# Patient Record
Sex: FEMALE | Race: BLACK OR AFRICAN AMERICAN | NOT HISPANIC OR LATINO | URBAN - METROPOLITAN AREA
[De-identification: names, ages, dates, MRNs, and addresses within clinical notes are randomized per-mention and may not be internally consistent; named-entity substitution may affect disease eponyms.]

---

## 2024-07-19 ENCOUNTER — HOSPITAL ENCOUNTER (EMERGENCY)
Facility: OTHER | Age: 34
Discharge: HOME | End: 2024-07-19
Attending: EMERGENCY MEDICINE
Payer: MEDICAID

## 2024-07-19 VITALS
RESPIRATION RATE: 15 BRPM | WEIGHT: 240 LBS | TEMPERATURE: 98 F | SYSTOLIC BLOOD PRESSURE: 102 MMHG | OXYGEN SATURATION: 98 % | BODY MASS INDEX: 38.57 KG/M2 | HEIGHT: 66 IN | HEART RATE: 90 BPM | DIASTOLIC BLOOD PRESSURE: 53 MMHG

## 2024-07-19 DIAGNOSIS — G43.909 MIGRAINE WITHOUT STATUS MIGRAINOSUS, NOT INTRACTABLE, UNSPECIFIED MIGRAINE TYPE: Primary | ICD-10-CM

## 2024-07-19 LAB
ALBUMIN SERPL BCP-MCNC: 4 G/DL (ref 3.5–5.2)
ALP SERPL-CCNC: 78 U/L (ref 55–135)
ALT SERPL W/O P-5'-P-CCNC: 19 U/L (ref 10–44)
ANION GAP SERPL CALC-SCNC: 13 MMOL/L (ref 8–16)
AST SERPL-CCNC: 17 U/L (ref 10–40)
BASOPHILS # BLD AUTO: 0.05 K/UL (ref 0–0.2)
BASOPHILS NFR BLD: 0.5 % (ref 0–1.9)
BILIRUB SERPL-MCNC: 0.3 MG/DL (ref 0.1–1)
BUN SERPL-MCNC: 12 MG/DL (ref 6–20)
CALCIUM SERPL-MCNC: 9.8 MG/DL (ref 8.7–10.5)
CHLORIDE SERPL-SCNC: 99 MMOL/L (ref 95–110)
CO2 SERPL-SCNC: 24 MMOL/L (ref 23–29)
CREAT SERPL-MCNC: 0.9 MG/DL (ref 0.5–1.4)
DIFFERENTIAL METHOD BLD: ABNORMAL
EOSINOPHIL # BLD AUTO: 0.1 K/UL (ref 0–0.5)
EOSINOPHIL NFR BLD: 1.4 % (ref 0–8)
ERYTHROCYTE [DISTWIDTH] IN BLOOD BY AUTOMATED COUNT: 14.9 % (ref 11.5–14.5)
EST. GFR  (NO RACE VARIABLE): >60 ML/MIN/1.73 M^2
GLUCOSE SERPL-MCNC: 141 MG/DL (ref 70–110)
HCT VFR BLD AUTO: 39.5 % (ref 37–48.5)
HGB BLD-MCNC: 13.3 G/DL (ref 12–16)
IMM GRANULOCYTES # BLD AUTO: 0.03 K/UL (ref 0–0.04)
IMM GRANULOCYTES NFR BLD AUTO: 0.3 % (ref 0–0.5)
LYMPHOCYTES # BLD AUTO: 3.3 K/UL (ref 1–4.8)
LYMPHOCYTES NFR BLD: 35 % (ref 18–48)
MCH RBC QN AUTO: 28.8 PG (ref 27–31)
MCHC RBC AUTO-ENTMCNC: 33.7 G/DL (ref 32–36)
MCV RBC AUTO: 86 FL (ref 82–98)
MONOCYTES # BLD AUTO: 0.7 K/UL (ref 0.3–1)
MONOCYTES NFR BLD: 7.9 % (ref 4–15)
NEUTROPHILS # BLD AUTO: 5.1 K/UL (ref 1.8–7.7)
NEUTROPHILS NFR BLD: 54.9 % (ref 38–73)
NRBC BLD-RTO: 0 /100 WBC
PLATELET # BLD AUTO: 375 K/UL (ref 150–450)
PMV BLD AUTO: 11.2 FL (ref 9.2–12.9)
POTASSIUM SERPL-SCNC: 2.8 MMOL/L (ref 3.5–5.1)
PROT SERPL-MCNC: 8.5 G/DL (ref 6–8.4)
RBC # BLD AUTO: 4.62 M/UL (ref 4–5.4)
SODIUM SERPL-SCNC: 136 MMOL/L (ref 136–145)
WBC # BLD AUTO: 9.35 K/UL (ref 3.9–12.7)

## 2024-07-19 PROCEDURE — 80053 COMPREHEN METABOLIC PANEL: CPT | Performed by: EMERGENCY MEDICINE

## 2024-07-19 PROCEDURE — 96375 TX/PRO/DX INJ NEW DRUG ADDON: CPT

## 2024-07-19 PROCEDURE — 96366 THER/PROPH/DIAG IV INF ADDON: CPT

## 2024-07-19 PROCEDURE — 25000003 PHARM REV CODE 250: Performed by: EMERGENCY MEDICINE

## 2024-07-19 PROCEDURE — 96361 HYDRATE IV INFUSION ADD-ON: CPT

## 2024-07-19 PROCEDURE — 99284 EMERGENCY DEPT VISIT MOD MDM: CPT | Mod: 25

## 2024-07-19 PROCEDURE — 96367 TX/PROPH/DG ADDL SEQ IV INF: CPT

## 2024-07-19 PROCEDURE — 96372 THER/PROPH/DIAG INJ SC/IM: CPT | Mod: 59 | Performed by: EMERGENCY MEDICINE

## 2024-07-19 PROCEDURE — 85025 COMPLETE CBC W/AUTO DIFF WBC: CPT | Performed by: EMERGENCY MEDICINE

## 2024-07-19 PROCEDURE — 63600175 PHARM REV CODE 636 W HCPCS: Performed by: EMERGENCY MEDICINE

## 2024-07-19 PROCEDURE — 96365 THER/PROPH/DIAG IV INF INIT: CPT

## 2024-07-19 RX ORDER — BUTALBITAL, ACETAMINOPHEN AND CAFFEINE 50; 325; 40 MG/1; MG/1; MG/1
1 TABLET ORAL EVERY 6 HOURS PRN
Qty: 15 TABLET | Refills: 0 | Status: SHIPPED | OUTPATIENT
Start: 2024-07-19 | End: 2024-08-18

## 2024-07-19 RX ORDER — DEXAMETHASONE SODIUM PHOSPHATE 4 MG/ML
4 INJECTION, SOLUTION INTRA-ARTICULAR; INTRALESIONAL; INTRAMUSCULAR; INTRAVENOUS; SOFT TISSUE
Status: COMPLETED | OUTPATIENT
Start: 2024-07-19 | End: 2024-07-19

## 2024-07-19 RX ORDER — MAGNESIUM SULFATE HEPTAHYDRATE 40 MG/ML
2 INJECTION, SOLUTION INTRAVENOUS
Status: COMPLETED | OUTPATIENT
Start: 2024-07-19 | End: 2024-07-19

## 2024-07-19 RX ORDER — METOCLOPRAMIDE HYDROCHLORIDE 5 MG/ML
10 INJECTION INTRAMUSCULAR; INTRAVENOUS
Status: COMPLETED | OUTPATIENT
Start: 2024-07-19 | End: 2024-07-19

## 2024-07-19 RX ORDER — SUMATRIPTAN 6 MG/.5ML
6 INJECTION, SOLUTION SUBCUTANEOUS
Status: COMPLETED | OUTPATIENT
Start: 2024-07-19 | End: 2024-07-19

## 2024-07-19 RX ORDER — POTASSIUM CHLORIDE 7.45 MG/ML
10 INJECTION INTRAVENOUS
Status: COMPLETED | OUTPATIENT
Start: 2024-07-19 | End: 2024-07-19

## 2024-07-19 RX ORDER — DIPHENHYDRAMINE HYDROCHLORIDE 50 MG/ML
25 INJECTION INTRAMUSCULAR; INTRAVENOUS
Status: COMPLETED | OUTPATIENT
Start: 2024-07-19 | End: 2024-07-19

## 2024-07-19 RX ORDER — KETOROLAC TROMETHAMINE 30 MG/ML
15 INJECTION, SOLUTION INTRAMUSCULAR; INTRAVENOUS
Status: COMPLETED | OUTPATIENT
Start: 2024-07-19 | End: 2024-07-19

## 2024-07-19 RX ORDER — ONDANSETRON 4 MG/1
4 TABLET, ORALLY DISINTEGRATING ORAL EVERY 8 HOURS PRN
Qty: 15 TABLET | Refills: 0 | Status: SHIPPED | OUTPATIENT
Start: 2024-07-19

## 2024-07-19 RX ADMIN — KETOROLAC TROMETHAMINE 15 MG: 30 INJECTION, SOLUTION INTRAMUSCULAR; INTRAVENOUS at 12:07

## 2024-07-19 RX ADMIN — MAGNESIUM SULFATE HEPTAHYDRATE 2 G: 40 INJECTION, SOLUTION INTRAVENOUS at 03:07

## 2024-07-19 RX ADMIN — SODIUM CHLORIDE 1000 ML: 9 INJECTION, SOLUTION INTRAVENOUS at 12:07

## 2024-07-19 RX ADMIN — DIPHENHYDRAMINE HYDROCHLORIDE 25 MG: 50 INJECTION, SOLUTION INTRAMUSCULAR; INTRAVENOUS at 12:07

## 2024-07-19 RX ADMIN — SUMATRIPTAN 6 MG: 6 INJECTION, SOLUTION SUBCUTANEOUS at 03:07

## 2024-07-19 RX ADMIN — POTASSIUM CHLORIDE 10 MEQ: 7.46 INJECTION, SOLUTION INTRAVENOUS at 02:07

## 2024-07-19 RX ADMIN — METOCLOPRAMIDE 10 MG: 5 INJECTION, SOLUTION INTRAMUSCULAR; INTRAVENOUS at 12:07

## 2024-07-19 RX ADMIN — POTASSIUM BICARBONATE 50 MEQ: 978 TABLET, EFFERVESCENT ORAL at 03:07

## 2024-07-19 RX ADMIN — DEXAMETHASONE SODIUM PHOSPHATE 4 MG: 4 INJECTION INTRA-ARTICULAR; INTRALESIONAL; INTRAMUSCULAR; INTRAVENOUS; SOFT TISSUE at 03:07

## 2024-07-19 NOTE — ED PROVIDER NOTES
Encounter Date: 7/18/2024       History     Chief Complaint   Patient presents with    Headache     C/o headache/migraine x 2.5 weeks, Seen in multiple ED's with no relief, Prescribed new meds to go with migraine meds with no relief,      33-year-old female with history of migraines, HTN presents for evaluation of persistent headache that has been ongoing for about 2 weeks.  Patient has been dealing with migraines for years, but have become more frequent in the past year and a half.  She has seen a neurologist and takes Maxalt for it which usually resolves it after a few days.  She had onset of her typical migrainous headache 2 weeks ago with no clear precipitant, no recent trauma or illness.  She describes it as left-sided retro-orbital throbbing pain that radiates to the back of her head associated with neck and shoulder tightness.  She tried taking Maxalt as well as numerous OTC medications including Excedrin, ibuprofen, Goody ASA all with no improvement.  She was seen at [facility] ER 4 days ago and was given migraine cocktail with improvement for a few hours but after she woke up that night the headache was back.  She also had a tele appointment with her PCP who advised adding naproxen but this did not improve her at all.  She saw her dermatologist earlier today and was referred to [facility] ED due to elevated blood pressure and headache, but waited there for hours without seeing a provider, though she did get a head CT before leaving and coming here.  She reports associated nausea with no emesis, has been trying to force herself to eat with somewhat normal p.o. intake.  She also reports associated photophobia, but no fevers or other new complaints.      Review of patient's allergies indicates:  No Known Allergies  No past medical history on file.  No past surgical history on file.  No family history on file.     Review of Systems   Constitutional:  Negative for fever.   HENT:  Negative for congestion.    Eyes:  Positive  for photophobia. Negative for redness.   Respiratory:  Negative for shortness of breath.    Cardiovascular:  Negative for chest pain.   Gastrointestinal:  Positive for nausea. Negative for abdominal pain.   Genitourinary:  Negative for dysuria.   Musculoskeletal:  Positive for neck pain.   Skin:  Negative for rash.   Neurological:  Positive for headaches.   Psychiatric/Behavioral:  Negative for confusion.        Physical Exam     Initial Vitals [07/19/24 0003]   BP Pulse Resp Temp SpO2   (!) 141/99 100 16 98 °F (36.7 °C) 100 %      MAP       --         Physical Exam    Constitutional: She is not diaphoretic. No distress.   HENT:   Head: Normocephalic and atraumatic.   Eyes: Conjunctivae are normal.   Photophobia   Neck: Neck supple.   No meningeal signs   Cardiovascular:  Normal rate, regular rhythm, S1 normal, S2 normal, normal heart sounds and intact distal pulses.           No murmur heard.  Pulmonary/Chest: Breath sounds normal. No respiratory distress. She has no wheezes. She has no rhonchi. She has no rales.   Abdominal: Abdomen is soft. There is no abdominal tenderness. There is no rebound and no guarding.   Musculoskeletal:         General: No edema.      Cervical back: Neck supple.     Neurological: She is alert and oriented to person, place, and time. She has normal strength. No cranial nerve deficit.   Skin: Skin is warm and dry.   Psychiatric: She has a normal mood and affect.         ED Course   Procedures  Labs Reviewed   CBC W/ AUTO DIFFERENTIAL - Abnormal; Notable for the following components:       Result Value    RDW 14.9 (*)     All other components within normal limits   COMPREHENSIVE METABOLIC PANEL - Abnormal; Notable for the following components:    Potassium 2.8 (*)     Glucose 141 (*)     Total Protein 8.5 (*)     All other components within normal limits          Imaging Results    None          Medications   sodium chloride 0.9% bolus 1,000 mL 1,000 mL (0 mLs Intravenous Stopped 7/19/24  0404)   ketorolac injection 15 mg (15 mg Intravenous Given 7/19/24 0043)   metoclopramide injection 10 mg (10 mg Intravenous Given 7/19/24 0044)   diphenhydrAMINE injection 25 mg (25 mg Intravenous Given 7/19/24 0043)   potassium chloride 10 mEq in 100 mL IVPB (0 mEq Intravenous Stopped 7/19/24 0404)   potassium bicarbonate disintegrating tablet 50 mEq (50 mEq Oral Given 7/19/24 0327)   magnesium sulfate 2g in water 50mL IVPB (premix) (0 g Intravenous Stopped 7/19/24 0525)   dexAMETHasone injection 4 mg (4 mg Intravenous Given 7/19/24 0319)   SUMAtriptan succinate injection 6 mg (6 mg Subcutaneous Given 7/19/24 0328)     Medical Decision Making      33-year-old female with history of migraines, HTN presents for evaluation of persistent headache that has been ongoing for about 2 weeks.  Patient has long history of migraines with similar headache that she has now, described as left-sided retro-orbital with radiation to the back of her head with neck and shoulder tightness.  Initial onset about 2 weeks ago without any clear precipitant, but no relief after taking her prescribed Maxalt and multiple other OTC medications; typically she has relief after a few days of headache.  She was seen at [facility] 4 days ago and treated with migraine cocktail with relief for few hours but headache recurred when she went home.  She was sent to [facility] ED by dermatology clinic due to elevated blood pressure and headache and had CT head there earlier tonight that was normal, but was not seen by provider before leaving and coming here.  She reports associated nausea, photophobia, but has been eating normally; no fevers or other new symptoms.  On arrival patient with mildly elevated blood pressure but otherwise normal vitals, afebrile with photophobia resting comfortably in dark room, no neuro deficits or meningeal signs or other concerning exam findings.  Differential diagnosis includes intractable migraine, tension headache, cluster headache.   No evidence for meningitis or intracranial hemorrhage, no indication for repeat imaging.  Will try migraine cocktail again and consider adding medications to break migrainous cycle.      After initial migraine cocktail of Toradol/Reglan/Benadryl, patient feels headache much improved, resting comfortably with no further nausea.  CBC unremarkable, but CMP shows hypokalemia with K 2.8, treated with IV and p.o. KCl; I suspect this is related to her poor p.o. intake related to persistent migraine and nausea over the past few weeks and should resolve when her appetite normalizes.  She was then given subQ Imitrex, IV Decadron, and IV magnesium as well and is now completely headache free, and she was observed for additional hours with no recurrence.  Patient advised to continue Maxalt as needed for any recurrent migraines, but also try NSAIDs, will also Rx Fioricet as second-line treatment.  She is comfortable with this discharge plan and understands return precautions for any recurrent persistent headache or other concerns.        Amount and/or Complexity of Data Reviewed  Labs: ordered.    Risk  Prescription drug management.                                      Clinical Impression:  Final diagnoses:  [G43.909] Migraine without status migrainosus, not intractable, unspecified migraine type (Primary)          ED Disposition Condition    Discharge Stable          ED Prescriptions       Medication Sig Dispense Start Date End Date Auth. Provider    ondansetron (ZOFRAN-ODT) 4 MG TbDL Take 1 tablet (4 mg total) by mouth every 8 (eight) hours as needed. 15 tablet 7/19/2024 -- Bradley Saavedra MD    butalbital-acetaminophen-caffeine -40 mg (FIORICET, ESGIC) -40 mg per tablet Take 1 tablet by mouth every 6 (six) hours as needed for Headaches. 15 tablet 7/19/2024 8/18/2024 Bradley Saavedra MD          Follow-up Information       Follow up With Specialties Details Why Contact Info    [facility] - Emergency Dept  Emergency Medicine Go to  If symptoms worsen [address] [facility] [address]  [phone]             Bradley Saavedra MD  07/19/24 8747

## 2024-07-19 NOTE — ED NOTES
Jacinto Hummel, a 33 y.o. female presents to the ED with 10/10 headache, covering head with jacket, exhibits photophobia. Pt denies nausea/vomiting. No other needs at this time. Visitor at bedside.    Pt resting comfortably.ED workup in progress. Call light within reach. Safety measures in place. Denies further needs. Plan of care ongoing.      Chief Complaint   Patient presents with    Headache     C/o headache/migraine x 2.5 weeks, Seen in multiple ED's with no relief, Prescribed new meds to go with migraine meds with no relief,      Review of patient's allergies indicates:  No Known Allergies  No past medical history on file.  No past surgical history on file.

## 2025-07-26 ENCOUNTER — HOSPITAL ENCOUNTER (EMERGENCY)
Facility: OTHER | Age: 35
Discharge: HOME | End: 2025-07-26
Attending: EMERGENCY MEDICINE
Payer: COMMERCIAL

## 2025-07-26 VITALS
BODY MASS INDEX: 37.67 KG/M2 | DIASTOLIC BLOOD PRESSURE: 88 MMHG | TEMPERATURE: 99 F | WEIGHT: 240 LBS | OXYGEN SATURATION: 100 % | SYSTOLIC BLOOD PRESSURE: 138 MMHG | RESPIRATION RATE: 14 BRPM | HEIGHT: 67 IN | HEART RATE: 79 BPM

## 2025-07-26 DIAGNOSIS — G43.009 MIGRAINE WITHOUT AURA AND WITHOUT STATUS MIGRAINOSUS, NOT INTRACTABLE: Primary | ICD-10-CM

## 2025-07-26 LAB
B-HCG UR QL: NEGATIVE
CTP QC/QA: YES
HCV AB SERPL QL IA: NORMAL
HIV 1+2 AB+HIV1 P24 AG SERPL QL IA: NORMAL

## 2025-07-26 PROCEDURE — 25000003 PHARM REV CODE 250: Performed by: NURSE PRACTITIONER

## 2025-07-26 PROCEDURE — 87389 HIV-1 AG W/HIV-1&-2 AB AG IA: CPT | Performed by: EMERGENCY MEDICINE

## 2025-07-26 PROCEDURE — 86803 HEPATITIS C AB TEST: CPT | Performed by: EMERGENCY MEDICINE

## 2025-07-26 PROCEDURE — 81025 URINE PREGNANCY TEST: CPT | Performed by: NURSE PRACTITIONER

## 2025-07-26 PROCEDURE — 96365 THER/PROPH/DIAG IV INF INIT: CPT

## 2025-07-26 PROCEDURE — 99284 EMERGENCY DEPT VISIT MOD MDM: CPT | Mod: 25

## 2025-07-26 PROCEDURE — 96375 TX/PRO/DX INJ NEW DRUG ADDON: CPT

## 2025-07-26 PROCEDURE — 96361 HYDRATE IV INFUSION ADD-ON: CPT

## 2025-07-26 PROCEDURE — 63600175 PHARM REV CODE 636 W HCPCS: Mod: JZ,TB

## 2025-07-26 RX ORDER — KETOROLAC TROMETHAMINE 30 MG/ML
15 INJECTION, SOLUTION INTRAMUSCULAR; INTRAVENOUS
Status: COMPLETED | OUTPATIENT
Start: 2025-07-26 | End: 2025-07-26

## 2025-07-26 RX ORDER — METOCLOPRAMIDE HYDROCHLORIDE 5 MG/ML
10 INJECTION INTRAMUSCULAR; INTRAVENOUS
Status: COMPLETED | OUTPATIENT
Start: 2025-07-26 | End: 2025-07-26

## 2025-07-26 RX ORDER — DIPHENHYDRAMINE HYDROCHLORIDE 50 MG/ML
12.5 INJECTION, SOLUTION INTRAMUSCULAR; INTRAVENOUS
Status: COMPLETED | OUTPATIENT
Start: 2025-07-26 | End: 2025-07-26

## 2025-07-26 RX ORDER — MAGNESIUM SULFATE HEPTAHYDRATE 40 MG/ML
2 INJECTION, SOLUTION INTRAVENOUS ONCE
Status: COMPLETED | OUTPATIENT
Start: 2025-07-26 | End: 2025-07-26

## 2025-07-26 RX ADMIN — MAGNESIUM SULFATE HEPTAHYDRATE 2 G: 40 INJECTION, SOLUTION INTRAVENOUS at 05:07

## 2025-07-26 RX ADMIN — SODIUM CHLORIDE 1000 ML: 9 INJECTION, SOLUTION INTRAVENOUS at 05:07

## 2025-07-26 RX ADMIN — METOCLOPRAMIDE 10 MG: 5 INJECTION, SOLUTION INTRAMUSCULAR; INTRAVENOUS at 05:07

## 2025-07-26 RX ADMIN — DIPHENHYDRAMINE HYDROCHLORIDE 12.5 MG: 50 INJECTION, SOLUTION INTRAMUSCULAR; INTRAVENOUS at 05:07

## 2025-07-26 RX ADMIN — KETOROLAC TROMETHAMINE 15 MG: 30 INJECTION, SOLUTION INTRAMUSCULAR; INTRAVENOUS at 05:07

## 2025-07-26 NOTE — ED PROVIDER NOTES
"HISTORY OF PRESENT ILLNESS: Jacinto Hummel is a 34 y.o. female  who presents to the emergency department today with complaints of a migraine x 4 days. She reports a history of migraines for which she follows with neurology and takes Ubrelvy. She describes this migraine like her usual migraines with temporal thorbbing headache, pain behind her eyes, and mild photophobia. No fever, neck stiffness, weakness, rash, N/V/D. This is not the worse headache of their life nor was it sudden onset.     The history is provided by the patient    ROS  10 point ROS performed and negative except as stated in HPI     ALLERGIES REVIEWED  MEDICATIONS REVIEWED  PMH/PSH/SOC/FH REVIEWED     Nursing/Ancillary staff note reviewed.  VS reviewed    Physical Exam  BP (!) 142/95 (BP Location: Left arm)   Pulse 87   Temp 98.1 °F (36.7 °C) (Oral)   Resp 16   Ht 5' 7" (1.702 m)   Wt 108.9 kg (240 lb)   LMP 07/14/2025   SpO2 99%   Breastfeeding No   BMI 37.59 kg/m²   Nurse's notes reviewed  General Appearance:  This is a well-developed individual who is in no acute distress.  Lying in bed but able to sit up on their own without any difficulty.  HEENT:  Head:  Normocephalic, atraumatic.      Eyes: Pupils equal and round no pallor or injection. Extra ocular movements intact. No nystagmus.  Neck: Neck is supple.   Respiratory: No respiratory distress. No tachypnea.  Cardiovascular: Regular rate and rhythm.   Gastrointestinal:  Abdomen is soft and non-distended  Neurological: Alert and oriented x 4. CN II-XII grossly intact. No focal weakness. Strength intact 5/5 bilaterally in upper and lower extremities.   Skin: Warm and dry, no rashes.      Medical Decision Making:     Initial management:  Urgent evaluation of a 34 y.o. female who presents to the ED today for headache. The pt has no red flags on history or exam. Patient has no neck stiffness/meningismus, fever, confusion, vision loss, temporal artery tenderness, rash, or thunderclap " onset to suggest pseudotumor cerebri, meningitis, tumor, ICH, temporal arteritis, or encephalitis. Has a history of headaches with similar symptoms. Plan to treat pain and reassess. Will consider imaging if pain is intractable.     DIFFERENTIAL DIAGNOSIS: After history and physical exam a differential diagnosis was considered, but was not limited to migraine headache, tension headache, vascular malformation, aneurysm, ICH, temporal arteritis, mass lesion, and infectious causes such as meningitis, pharyngitis and sinusitis.    No orders to display       Labs Reviewed   HEPATITIS C ANTIBODY - Normal       Result Value    Hep C Ab Interp Non-Reactive     HIV 1 / 2 ANTIBODY - Normal    HIV 1/2 Ag/Ab Non-Reactive     HEP C VIRUS HOLD SPECIMEN   POCT URINE PREGNANCY    POC Preg Test, Ur Negative       Acceptable Yes              Medications   sodium chloride 0.9% bolus 1,000 mL 1,000 mL (1,000 mLs Intravenous New Bag 7/26/25 1713)   ketorolac injection 15 mg (15 mg Intravenous Given 7/26/25 1720)   metoclopramide injection 10 mg (10 mg Intravenous Given 7/26/25 1720)   diphenhydrAMINE injection 12.5 mg (12.5 mg Intravenous Given 7/26/25 1720)   magnesium sulfate 2g in water 50mL IVPB (premix) (2 g Intravenous New Bag 7/26/25 1750)       ED Management:  Patient remains afebrile nontoxic-appearing.  There continues to be no focal neurological deficits.  Upon re-evaluation, patient reports significant improvements. The patient is feeling improved and is comfortable going home at this time.  Will discharge home with symptom control and have him follow up with their primary care physician for further evaluation is necessary. The patient understands and is comfortable going home at this time.  Patient given return precautions and educated on worrisome symptoms for which they should return to the ER, including fever, neck pain, worsening headache, weakness, vision changes, nonstop vomiting. They reported  understanding and all questions were answered.      IMPRESSION:    The encounter diagnosis was Migraine without aura and without status migrainosus, not intractable.         Medication List        ASK your doctor about these medications      ondansetron 4 MG Tbdl  Commonly known as: ZOFRAN-ODT  Take 1 tablet (4 mg total) by mouth every 8 (eight) hours as needed.               Launch MDCalc MDM  MDCalc MDM Module  Jul 26 2025 5:40 PM [Sue Bae]  Data:  - Test/documents: 1 test ordered  Additional encounter diagnoses: Migraine without aura and without status migrainosus, not intractable  Risk: magnesium sulfate in water IVPB (Require intensive monitoring)              Sue Bae, TYRONE  07/26/25 4496

## 2025-07-26 NOTE — FIRST PROVIDER EVALUATION
" Emergency Department TeleTriage Encounter Note      CHIEF COMPLAINT    Chief Complaint   Patient presents with    Migraine     Pt presents to the ER with complaints of a migraine headache for the past two days; no relief with home medications.         VITAL SIGNS   Initial Vitals [07/26/25 1523]   BP Pulse Resp Temp SpO2   (!) 142/95 87 16 98.1 °F (36.7 °C) 99 %      MAP       --            ALLERGIES    Review of patient's allergies indicates:   Allergen Reactions    Codeine Hives       PROVIDER TRIAGE NOTE  This is a teletriage evaluation of a 34 y.o. female presenting to the ED complaining of migraine for two days, consistent with previous migraines. Denies numbness/tingling, fever, and visual changes.     Initial orders will be placed and care will be transferred to an alternate provider when patient is roomed for a full evaluation. Any additional orders and the final disposition will be determined by that provider.         ORDERS  Labs Reviewed   HEPATITIS C ANTIBODY   HEP C VIRUS HOLD SPECIMEN   HIV 1 / 2 ANTIBODY       ED Orders (720h ago, onward)      Start Ordered     Status Ordering Provider    07/26/25 1545 07/26/25 1541  sodium chloride 0.9% bolus 1,000 mL 1,000 mL  ED 1 Time         Ordered FREDDY HUERTA N.    07/26/25 1542 07/26/25 1541  Insert Saline lock IV  Once         Ordered FREDDY HUERTA N.    07/26/25 1542 07/26/25 1541  POCT urine pregnancy  Once         Ordered FREDDY HUERTA N.    07/26/25 1525 07/26/25 1524  Hepatitis C Antibody  STAT        Placed in "And" Linked Group    Ordered ERIC NG    07/26/25 1525 07/26/25 1524  HCV Virus Hold Specimen  STAT        Placed in "And" Linked Group    Ordered ERIC NG.    07/26/25 1525 07/26/25 1524  HIV 1/2 Ag/Ab (4th Gen)  STAT         Ordered ERIC NG              Virtual Visit Note: The provider triage portion of this emergency department evaluation and documentation was performed via [employer], a " HIPAA-compliant telemedicine application, in concert with a tele-presenter in the room. A face to face patient evaluation with one of my colleagues will occur once the patient is placed in an emergency department room.      DISCLAIMER: This note was prepared with [employer] voice recognition transcription software. Garbled syntax, mangled pronouns, and other bizarre constructions may be attributed to that software system.

## 2025-07-27 LAB — HOLD SPECIMEN: 0
